# Patient Record
Sex: MALE | Race: BLACK OR AFRICAN AMERICAN | NOT HISPANIC OR LATINO | Employment: UNEMPLOYED | URBAN - METROPOLITAN AREA
[De-identification: names, ages, dates, MRNs, and addresses within clinical notes are randomized per-mention and may not be internally consistent; named-entity substitution may affect disease eponyms.]

---

## 2022-08-09 ENCOUNTER — OFFICE VISIT (OUTPATIENT)
Dept: URGENT CARE | Facility: CLINIC | Age: 27
End: 2022-08-09
Payer: COMMERCIAL

## 2022-08-09 VITALS — TEMPERATURE: 97.5 F | OXYGEN SATURATION: 99 % | HEART RATE: 86 BPM | WEIGHT: 165 LBS

## 2022-08-09 DIAGNOSIS — S61.219A LACERATION WITHOUT FOREIGN BODY OF UNSPECIFIED FINGER WITHOUT DAMAGE TO NAIL, INITIAL ENCOUNTER: Primary | ICD-10-CM

## 2022-08-09 PROCEDURE — G0382 LEV 3 HOSP TYPE B ED VISIT: HCPCS | Performed by: EMERGENCY MEDICINE

## 2022-08-09 PROCEDURE — 90715 TDAP VACCINE 7 YRS/> IM: CPT

## 2022-08-09 RX ORDER — DOXYCYCLINE HYCLATE 100 MG/1
100 CAPSULE ORAL 2 TIMES DAILY
Qty: 20 CAPSULE | Refills: 0 | Status: SHIPPED | OUTPATIENT
Start: 2022-08-09 | End: 2022-08-19

## 2022-08-09 RX ORDER — LIDOCAINE HYDROCHLORIDE 20 MG/ML
10 INJECTION, SOLUTION EPIDURAL; INFILTRATION; INTRACAUDAL; PERINEURAL ONCE
Status: DISCONTINUED | OUTPATIENT
Start: 2022-08-09 | End: 2022-08-09

## 2022-08-09 NOTE — PROGRESS NOTES
[facility]        NAME: Morales Scott is a 32 y o  male  : 1995    MRN: [id number]  DATE: 2022  TIME: 7:05 PM    Assessment and Plan   Laceration without foreign body of unspecified finger without damage to nail, initial encounter [S61 219A]  1  Laceration without foreign body of unspecified finger without damage to nail, initial encounter  Tdap Vaccine greater than or equal to 6yo    doxycycline hyclate (VIBRAMYCIN) 100 mg capsule    DISCONTINUED: lidocaine (PF) (XYLOCAINE-MPF) 2 % injection 10 mL         Patient Instructions   Patient Instructions   1  Clean and dry x 24 hrs  2  Cleanse twice a day with peroxide  3  Suture removal 10-12 days  4  Wound precautions  5  F/u with PCP in 2-3 days  6  If redness, pus, etc , start the antibiotic; the only way to tell if there is a fracture, is to X-Ray it  Return immediately if any problems        Follow up with PCP in 3-5 days  Proceed to  ER if symptoms worsen  Chief Complaint     Chief Complaint   Patient presents with    Laceration     Sustained laceration to right hand  Tetanus is NOT UTD  History of Present Illness       33 yo b male with cc cutting his R hand after a saw fell off a ladder landing on his hand  Pt  Is requesting no lidocaine or X-Rays, due to no insurance  Review of Systems   Review of Systems   Constitutional: Negative for chills and fever  HENT: Negative for congestion and rhinorrhea  Eyes: Negative for discharge and visual disturbance  Respiratory: Negative for shortness of breath and wheezing  Cardiovascular: Negative for chest pain and palpitations  Gastrointestinal: Negative for abdominal pain and vomiting  Endocrine: Negative for polydipsia and polyuria  Genitourinary: Negative for dysuria and hematuria  Musculoskeletal: Negative for arthralgias, gait problem and neck stiffness  Skin: Positive for wound  Negative for rash     Neurological: Negative for dizziness and headaches  Psychiatric/Behavioral: Negative for confusion and suicidal ideas  Current Medications       Current Outpatient Medications:     doxycycline hyclate (VIBRAMYCIN) 100 mg capsule, Take 1 capsule (100 mg total) by mouth 2 (two) times a day for 10 days, Disp: 20 capsule, Rfl: 0  No current facility-administered medications for this visit  Current Allergies     Allergies as of 08/09/2022 - Reviewed 08/09/2022   Allergen Reaction Noted    Soybean-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 08/09/2022    Treenut [nuts - food allergy] Anaphylaxis 08/09/2022    Penicillins Swelling 08/09/2022            The following portions of the patient's history were reviewed and updated as appropriate: allergies, current medications, past family history, past medical history, past social history, past surgical history and problem list      Past Medical History:   Diagnosis Date    Asthma        History reviewed  No pertinent surgical history  History reviewed  No pertinent family history  Medications have been verified  Objective   Pulse 86   Temp 97 5 °F (36 4 °C) (Tympanic)   Wt 74 8 kg (165 lb)   SpO2 99%  d       Physical Exam     Physical Exam  Vitals and nursing note reviewed  Constitutional:       Appearance: Normal appearance  Comments: 33 yo b male sitting on the stretcher with a laceration to his R hand  HENT:      Head: Normocephalic and atraumatic  Nose: Nose normal    Eyes:      Extraocular Movements: Extraocular movements intact  Conjunctiva/sclera: Conjunctivae normal    Cardiovascular:      Rate and Rhythm: Normal rate  Pulmonary:      Effort: Pulmonary effort is normal    Musculoskeletal:         General: Normal range of motion  Cervical back: Neck supple  Skin:     General: Skin is warm and dry  Comments: + 1" lac to the dorsum of R hand, mid 5th MCP region with some swelling    Full ROM, in flexion and extension, aduction and abduction  Neurological:      General: No focal deficit present  Mental Status: He is alert and oriented to person, place, and time  Psychiatric:         Mood and Affect: Mood normal        Laceration repair    Date/Time: 8/9/2022 12:30 PM  Performed by: Yu Turcios DO  Authorized by: Yu Turcios DO   Consent: Verbal consent obtained  Risks and benefits: risks, benefits and alternatives were discussed  Patient understanding: patient states understanding of the procedure being performed  Patient identity confirmed: verbally with patient  Body area: upper extremity  Location details: right hand  Laceration length: 2 5 cm  Tendon involvement: none  Nerve involvement: none  Vascular damage: no  Anesthesia method: Pt  requested no anesthesia  Anesthesia:  Anesthetic total: 0 mL    Sedation:  Patient sedated: no      Wound Dehiscence:  Superficial Wound Dehiscence: simple closure      Procedure Details:  Preparation: Patient was prepped and draped in the usual sterile fashion  Irrigation solution: saline  Irrigation method: syringe  Amount of cleaning: standard  Debridement: none  Degree of undermining: none  Skin closure: 4-0 nylon  Number of sutures: 3  Technique: simple  Approximation: close  Approximation difficulty: simple  Dressing: bandaid    Patient tolerance: patient tolerated the procedure well with no immediate complications

## 2022-08-09 NOTE — PATIENT INSTRUCTIONS
Clean and dry x 24 hrs  Cleanse twice a day with peroxide  Suture removal 10-12 days  Wound precautions  F/u with PCP in 2-3 days  If redness, pus, etc , start the antibiotic; the only way to tell if there is a fracture, is to X-Ray it   Return immediately if any problems

## 2023-08-21 ENCOUNTER — APPOINTMENT (OUTPATIENT)
Dept: RADIOLOGY | Facility: HOSPITAL | Age: 28
End: 2023-08-21
Payer: COMMERCIAL

## 2023-08-21 ENCOUNTER — HOSPITAL ENCOUNTER (EMERGENCY)
Facility: HOSPITAL | Age: 28
Discharge: HOME | End: 2023-08-22
Admitting: EMERGENCY MEDICINE
Payer: COMMERCIAL

## 2023-08-21 VITALS
TEMPERATURE: 97.9 F | DIASTOLIC BLOOD PRESSURE: 90 MMHG | HEART RATE: 85 BPM | SYSTOLIC BLOOD PRESSURE: 138 MMHG | BODY MASS INDEX: 25.07 KG/M2 | OXYGEN SATURATION: 99 % | HEIGHT: 66 IN | RESPIRATION RATE: 18 BRPM | WEIGHT: 156 LBS

## 2023-08-21 DIAGNOSIS — R07.9 CHEST PAIN, UNSPECIFIED TYPE: Primary | ICD-10-CM

## 2023-08-21 DIAGNOSIS — E87.6 HYPOKALEMIA: ICD-10-CM

## 2023-08-21 LAB
ALBUMIN SERPL BCP-MCNC: 4.4 G/DL (ref 3.5–5)
ALP SERPL-CCNC: 54 U/L (ref 34–104)
ALT SERPL W P-5'-P-CCNC: 14 U/L (ref 7–52)
ANION GAP SERPL CALCULATED.3IONS-SCNC: 9 MMOL/L
AST SERPL W P-5'-P-CCNC: 26 U/L (ref 13–39)
BASOPHILS # BLD AUTO: 0.05 THOUSANDS/ÂΜL (ref 0–0.1)
BASOPHILS NFR BLD AUTO: 1 % (ref 0–1)
BILIRUB SERPL-MCNC: 0.4 MG/DL (ref 0.2–1)
BUN SERPL-MCNC: 9 MG/DL (ref 5–25)
CALCIUM SERPL-MCNC: 9.3 MG/DL (ref 8.4–10.2)
CARDIAC TROPONIN I PNL SERPL HS: <2 NG/L
CHLORIDE SERPL-SCNC: 102 MMOL/L (ref 96–108)
CO2 SERPL-SCNC: 29 MMOL/L (ref 21–32)
CREAT SERPL-MCNC: 1.14 MG/DL (ref 0.6–1.3)
EOSINOPHIL # BLD AUTO: 0.25 THOUSAND/ÂΜL (ref 0–0.61)
EOSINOPHIL NFR BLD AUTO: 3 % (ref 0–6)
ERYTHROCYTE [DISTWIDTH] IN BLOOD BY AUTOMATED COUNT: 13.8 % (ref 11.6–15.1)
GFR SERPL CREATININE-BSD FRML MDRD: 85 ML/MIN/1.73SQ M
GLUCOSE SERPL-MCNC: 87 MG/DL (ref 65–140)
HCT VFR BLD AUTO: 41.7 % (ref 36.5–49.3)
HGB BLD-MCNC: 14 G/DL (ref 12–17)
IMM GRANULOCYTES # BLD AUTO: 0.02 THOUSAND/UL (ref 0–0.2)
IMM GRANULOCYTES NFR BLD AUTO: 0 % (ref 0–2)
LYMPHOCYTES # BLD AUTO: 3.32 THOUSANDS/ÂΜL (ref 0.6–4.47)
LYMPHOCYTES NFR BLD AUTO: 39 % (ref 14–44)
MCH RBC QN AUTO: 28.8 PG (ref 26.8–34.3)
MCHC RBC AUTO-ENTMCNC: 33.6 G/DL (ref 31.4–37.4)
MCV RBC AUTO: 86 FL (ref 82–98)
MONOCYTES # BLD AUTO: 0.75 THOUSAND/ÂΜL (ref 0.17–1.22)
MONOCYTES NFR BLD AUTO: 9 % (ref 4–12)
NEUTROPHILS # BLD AUTO: 4.18 THOUSANDS/ÂΜL (ref 1.85–7.62)
NEUTS SEG NFR BLD AUTO: 48 % (ref 43–75)
NRBC BLD AUTO-RTO: 0 /100 WBCS
PLATELET # BLD AUTO: 222 THOUSANDS/UL (ref 149–390)
PMV BLD AUTO: 10.9 FL (ref 8.9–12.7)
POTASSIUM SERPL-SCNC: 2.9 MMOL/L (ref 3.5–5.3)
PROT SERPL-MCNC: 7.8 G/DL (ref 6.4–8.4)
RBC # BLD AUTO: 4.86 MILLION/UL (ref 3.88–5.62)
SODIUM SERPL-SCNC: 140 MMOL/L (ref 135–147)
WBC # BLD AUTO: 8.57 THOUSAND/UL (ref 4.31–10.16)

## 2023-08-21 PROCEDURE — 99285 EMERGENCY DEPT VISIT HI MDM: CPT

## 2023-08-21 PROCEDURE — 71046 X-RAY EXAM CHEST 2 VIEWS: CPT

## 2023-08-21 PROCEDURE — 80053 COMPREHEN METABOLIC PANEL: CPT

## 2023-08-21 PROCEDURE — 85025 COMPLETE CBC W/AUTO DIFF WBC: CPT

## 2023-08-21 PROCEDURE — 36415 COLL VENOUS BLD VENIPUNCTURE: CPT

## 2023-08-21 PROCEDURE — 84484 ASSAY OF TROPONIN QUANT: CPT

## 2023-08-21 PROCEDURE — 93005 ELECTROCARDIOGRAM TRACING: CPT

## 2023-08-22 LAB
ATRIAL RATE: 75 BPM
CARDIAC TROPONIN I PNL SERPL HS: <2 NG/L
MAGNESIUM SERPL-MCNC: 2.2 MG/DL (ref 1.9–2.7)
P AXIS: 59 DEGREES
PR INTERVAL: 166 MS
QRS AXIS: 86 DEGREES
QRSD INTERVAL: 88 MS
QT INTERVAL: 374 MS
QTC INTERVAL: 417 MS
T WAVE AXIS: 37 DEGREES
VENTRICULAR RATE: 75 BPM

## 2023-08-22 PROCEDURE — 83735 ASSAY OF MAGNESIUM: CPT

## 2023-08-22 PROCEDURE — 99284 EMERGENCY DEPT VISIT MOD MDM: CPT | Performed by: EMERGENCY MEDICINE

## 2023-08-22 PROCEDURE — 93010 ELECTROCARDIOGRAM REPORT: CPT | Performed by: INTERNAL MEDICINE

## 2023-08-22 PROCEDURE — 36415 COLL VENOUS BLD VENIPUNCTURE: CPT

## 2023-08-22 PROCEDURE — 84484 ASSAY OF TROPONIN QUANT: CPT

## 2023-08-22 PROCEDURE — 96365 THER/PROPH/DIAG IV INF INIT: CPT

## 2023-08-22 RX ORDER — POTASSIUM CHLORIDE 20 MEQ/1
20 TABLET, EXTENDED RELEASE ORAL ONCE
Status: COMPLETED | OUTPATIENT
Start: 2023-08-22 | End: 2023-08-22

## 2023-08-22 RX ORDER — POTASSIUM CHLORIDE 14.9 MG/ML
20 INJECTION INTRAVENOUS ONCE
Status: COMPLETED | OUTPATIENT
Start: 2023-08-22 | End: 2023-08-22

## 2023-08-22 RX ADMIN — POTASSIUM CHLORIDE 20 MEQ: 200 INJECTION, SOLUTION INTRAVENOUS at 00:47

## 2023-08-22 RX ADMIN — POTASSIUM CHLORIDE 20 MEQ: 1500 TABLET, EXTENDED RELEASE ORAL at 00:47

## 2023-08-22 NOTE — ED PROVIDER NOTES
History  Chief Complaint   Patient presents with   • Chest Pain     Patient c/o left upper chest pain that started around 5pm.  Patient reports feeling lightheaded. Patient is a 69-year-old male with no pertinent past medical history presents ED for chest pain. Symptoms started around 5 PM tonight. Complains of substernal chest pain, characterizes this as burning. This is nonradiating. States that this lasted for about 15 to 20 minutes. States that he did eat [employer] prior to symptoms developing. Reports that he was at a corn hole tournament tonight and felt dizzy intermittently. States that he has not been feeling well. He denies shortness of breath, cough, fever, chills, abdominal pain, vomiting, diarrhea and vision changes. None       Past Medical History:   Diagnosis Date   • Asthma        History reviewed. No pertinent surgical history. History reviewed. No pertinent family history. I have reviewed and agree with the history as documented. E-Cigarette/Vaping   • E-Cigarette Use Current Every Day User      E-Cigarette/Vaping Substances   • Nicotine Yes      Social History     Tobacco Use   • Smoking status: Former     Packs/day: 0.50     Types: Cigarettes   • Smokeless tobacco: Never   Vaping Use   • Vaping Use: Every day   • Substances: Nicotine   Substance Use Topics   • Alcohol use: Yes     Comment: social   • Drug use: Never       Review of Systems   Constitutional: Negative for chills and fever. HENT: Negative for ear pain and sore throat. Eyes: Negative for pain and visual disturbance. Respiratory: Negative for cough and shortness of breath. Cardiovascular: Positive for chest pain. Negative for palpitations. Gastrointestinal: Negative for abdominal pain and vomiting. Genitourinary: Negative for dysuria and hematuria. Musculoskeletal: Negative for arthralgias and back pain. Skin: Negative for color change and rash. Neurological: Positive for dizziness.  Negative for seizures and syncope. All other systems reviewed and are negative. Physical Exam  Physical Exam  Vitals and nursing note reviewed. Constitutional:       General: He is not in acute distress. Appearance: He is well-developed. He is not ill-appearing. HENT:      Head: Normocephalic and atraumatic. Eyes:      Conjunctiva/sclera: Conjunctivae normal.   Cardiovascular:      Rate and Rhythm: Normal rate and regular rhythm. Heart sounds: No murmur heard. Pulmonary:      Effort: Pulmonary effort is normal. No respiratory distress. Breath sounds: Normal breath sounds. Abdominal:      Palpations: Abdomen is soft. Tenderness: There is no abdominal tenderness. Musculoskeletal:         General: No swelling. Cervical back: Neck supple. Skin:     General: Skin is warm and dry. Capillary Refill: Capillary refill takes less than 2 seconds. Neurological:      Mental Status: He is alert.    Psychiatric:         Mood and Affect: Mood normal.         Vital Signs  ED Triage Vitals [08/21/23 2250]   Temperature Pulse Respirations Blood Pressure SpO2   97.9 °F (36.6 °C) 85 18 138/90 99 %      Temp Source Heart Rate Source Patient Position - Orthostatic VS BP Location FiO2 (%)   Tympanic Monitor Sitting Left arm --      Pain Score       --           Vitals:    08/21/23 2250   BP: 138/90   Pulse: 85   Patient Position - Orthostatic VS: Sitting         Visual Acuity      ED Medications  Medications   potassium chloride 20 mEq IVPB (premix) (20 mEq Intravenous New Bag 8/22/23 0047)   potassium chloride (K-DUR,KLOR-CON) CR tablet 20 mEq (20 mEq Oral Given 8/22/23 0047)       Diagnostic Studies  Results Reviewed     Procedure Component Value Units Date/Time    HS Troponin I 2hr [[id number]] Collected: 08/22/23 0028    Lab Status: Final result Specimen: Blood from Arm, Right Updated: 08/22/23 0101     hs TnI 2hr <2 ng/L      Delta 2hr hsTnI --    Magnesium [[id number]]  (Normal) Collected: 08/22/23 0028    Lab Status: Final result Specimen: Blood from Arm, Right Updated: 08/22/23 0052     Magnesium 2.2 mg/dL     HS Troponin I 4hr [[id number]]     Lab Status: No result Specimen: Blood     HS Troponin 0hr (reflex protocol) [[id number]]  (Normal) Collected: 08/21/23 2256    Lab Status: Final result Specimen: Blood from Arm, Left Updated: 08/21/23 2345     hs TnI 0hr <2 ng/L     Comprehensive metabolic panel [[id number]]  (Abnormal) Collected: 08/21/23 2256    Lab Status: Final result Specimen: Blood from Arm, Left Updated: 08/21/23 2340     Sodium 140 mmol/L      Potassium 2.9 mmol/L      Chloride 102 mmol/L      CO2 29 mmol/L      ANION GAP 9 mmol/L      BUN 9 mg/dL      Creatinine 1.14 mg/dL      Glucose 87 mg/dL      Calcium 9.3 mg/dL      AST 26 U/L      ALT 14 U/L      Alkaline Phosphatase 54 U/L      Total Protein 7.8 g/dL      Albumin 4.4 g/dL      Total Bilirubin 0.40 mg/dL      eGFR 85 ml/min/1.73sq m     Narrative:      [place] guidelines for Chronic Kidney Disease (CKD):   •  Stage 1 with normal or high GFR (GFR > 90 mL/min/1.73 square meters)  •  Stage 2 Mild CKD (GFR = 60-89 mL/min/1.73 square meters)  •  Stage 3A Moderate CKD (GFR = 45-59 mL/min/1.73 square meters)  •  Stage 3B Moderate CKD (GFR = 30-44 mL/min/1.73 square meters)  •  Stage 4 Severe CKD (GFR = 15-29 mL/min/1.73 square meters)  •  Stage 5 End Stage CKD (GFR <15 mL/min/1.73 square meters)  Note: GFR calculation is accurate only with a steady state creatinine    CBC and differential [[id number]] Collected: 08/21/23 2256    Lab Status: Final result Specimen: Blood from Arm, Left Updated: 08/21/23 2312     WBC 8.57 Thousand/uL      RBC 4.86 Million/uL      Hemoglobin 14.0 g/dL      Hematocrit 41.7 %      MCV 86 fL      MCH 28.8 pg      MCHC 33.6 g/dL      RDW 13.8 %      MPV 10.9 fL      Platelets 628 Thousands/uL      nRBC 0 /100 WBCs      Neutrophils Relative 48 %      Immat GRANS % 0 %      Lymphocytes Relative 39 %      Monocytes Relative 9 %      Eosinophils Relative 3 %      Basophils Relative 1 %      Neutrophils Absolute 4.18 Thousands/µL      Immature Grans Absolute 0.02 Thousand/uL      Lymphocytes Absolute 3.32 Thousands/µL      Monocytes Absolute 0.75 Thousand/µL      Eosinophils Absolute 0.25 Thousand/µL      Basophils Absolute 0.05 Thousands/µL                  XR chest pa & lateral    (Results Pending)              Procedures  Procedures         ED Course                               SBIRT 20yo+    Flowsheet Row Most Recent Value   Initial Alcohol Screen: US AUDIT-C     1. How often do you have a drink containing alcohol? 0 Filed at: 08/21/2023 2252   2. How many drinks containing alcohol do you have on a typical day you are drinking? 0 Filed at: 08/21/2023 2252   3a. Male UNDER 65: How often do you have five or more drinks on one occasion? 0 Filed at: 08/21/2023 2252   3b. FEMALE Any Age, or MALE 65+: How often do you have 4 or more drinks on one occassion? 0 Filed at: 08/21/2023 2252   Audit-C Score 0 Filed at: 08/21/2023 2252   MARGO: How many times in the past year have you. .. Used an illegal drug or used a prescription medication for non-medical reasons? Never Filed at: 08/21/2023 2252                    Medical Decision Making  Amount and/or Complexity of Data Reviewed  Labs: ordered. Risk  Prescription drug management. Patient is a 25-year-old male who presents to ED for chest pain. Labs reviewed. Potassium was 2.9. Repleted this in the ED. X-ray and EKG are unremarkable. Gave strict return precautions, verbalized understanding.     Disposition  Final diagnoses:   Chest pain, unspecified type   Hypokalemia     Time reflects when diagnosis was documented in both MDM as applicable and the Disposition within this note     Time User Action Codes Description Comment    8/22/2023  1:42 AM Suyapa Bravo Add [R07.9] Chest pain, unspecified type     8/22/2023  1:43 AM Valarie Brian Add [E87.6] Hypokalemia       ED Disposition     ED Disposition   Discharge    Condition   Stable    Date/Time   Tue Aug 22, 2023  1:42 AM    Comment   Gisel Godoy discharge to home/self care. Follow-up Information    None         Patient's Medications    No medications on file       No discharge procedures on file.     PDMP Review     None          ED Provider  Electronically Signed by           Maria Dolores Galeano PA-C  08/22/23 7562

## 2023-10-03 ENCOUNTER — OFFICE VISIT (OUTPATIENT)
Dept: URGENT CARE | Facility: CLINIC | Age: 28
End: 2023-10-03
Payer: COMMERCIAL

## 2023-10-03 VITALS
TEMPERATURE: 98.5 F | HEART RATE: 73 BPM | DIASTOLIC BLOOD PRESSURE: 81 MMHG | OXYGEN SATURATION: 97 % | SYSTOLIC BLOOD PRESSURE: 120 MMHG | RESPIRATION RATE: 18 BRPM

## 2023-10-03 DIAGNOSIS — K12.2 CELLULITIS OF MOUTH: Primary | ICD-10-CM

## 2023-10-03 PROCEDURE — S9088 SERVICES PROVIDED IN URGENT: HCPCS | Performed by: PHYSICIAN ASSISTANT

## 2023-10-03 PROCEDURE — 99213 OFFICE O/P EST LOW 20 MIN: CPT | Performed by: PHYSICIAN ASSISTANT

## 2023-10-03 RX ORDER — CLINDAMYCIN HYDROCHLORIDE 300 MG/1
300 CAPSULE ORAL EVERY 6 HOURS
COMMUNITY
Start: 2023-08-29

## 2023-10-03 RX ORDER — CLINDAMYCIN HYDROCHLORIDE 300 MG/1
300 CAPSULE ORAL 3 TIMES DAILY
Qty: 30 CAPSULE | Refills: 0 | Status: SHIPPED | OUTPATIENT
Start: 2023-10-03 | End: 2023-10-13

## 2023-10-03 NOTE — PROGRESS NOTES
[facility]        NAME: Checo Olmos is a 25 y.o. male  : 1995    MRN: [id number]  DATE: October 3, 2023  TIME: 4:19 PM      Assessment and Plan     Cellulitis of mouth [K12.2]  1. Cellulitis of mouth  clindamycin (CLEOCIN) 300 MG capsule            Patient Instructions   There are no Patient Instructions on file for this visit. Follow up with PCP in 3-5 days. Go to ER if symptoms worsen. Chief Complaint     Chief Complaint   Patient presents with   • Edema     Patient has a swollen cheek, he had it about a month ago, and was on antibiotics. He states he has bad teeth . History of Present Illness     Patient presents with right cheek swollen x 2 days. He states this happened about 1 month ago and he was put on clindamycin. He states he stopped taking the medicine when he felt better. He states he has tooth issues and still has his impacted wisdom teeth. Review of Systems     Review of Systems   Constitutional: Negative for chills, fatigue and fever. HENT: Positive for dental problem and facial swelling. Negative for congestion, ear pain, postnasal drip, rhinorrhea, sinus pressure, sinus pain, sneezing and sore throat. Eyes: Negative for pain and visual disturbance. Respiratory: Negative for cough and shortness of breath. Cardiovascular: Negative for chest pain and palpitations. Gastrointestinal: Negative for abdominal pain, diarrhea, nausea and vomiting. Genitourinary: Negative for dysuria and hematuria. Musculoskeletal: Negative for arthralgias, back pain and myalgias. Skin: Negative for rash. Neurological: Negative for dizziness, seizures, syncope, numbness and headaches. All other systems reviewed and are negative.         Current Medications       Current Outpatient Medications:   •  clindamycin (CLEOCIN) 300 MG capsule, Take 300 mg by mouth every 6 (six) hours, Disp: , Rfl:   •  clindamycin (CLEOCIN) 300 MG capsule, Take 1 capsule (300 mg total) by mouth 3 (three) times a day for 10 days, Disp: 30 capsule, Rfl: 0    Current Allergies     Allergies as of 10/03/2023 - Reviewed 10/03/2023   Allergen Reaction Noted   • Soybean-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 08/09/2022   • Treenut [nuts - food allergy] Anaphylaxis 08/09/2022   • Penicillins Swelling 08/09/2022              The following portions of the patient's history were reviewed and updated as appropriate: allergies, current medications, past family history, past medical history, past social history, past surgical history, and problem list.     Past Medical History:   Diagnosis Date   • Asthma        History reviewed. No pertinent surgical history. History reviewed. No pertinent family history. Medications have been verified. Objective     /81   Pulse 73   Temp 98.5 °F (36.9 °C)   Resp 18   SpO2 97%   No LMP for male patient. Physical Exam     Physical Exam  Vitals and nursing note reviewed. Constitutional:       Appearance: Normal appearance. He is normal weight. HENT:      Head: Normocephalic and atraumatic. Mouth/Throat:      Dentition: Gingival swelling present. Comments: Right upper posterior arch with mild swelling and erythema. Right cheek moderately swollen. Cardiovascular:      Rate and Rhythm: Normal rate and regular rhythm. Heart sounds: Normal heart sounds. Pulmonary:      Effort: Pulmonary effort is normal.      Breath sounds: Normal breath sounds. Skin:     General: Skin is warm and dry. Neurological:      General: No focal deficit present. Mental Status: He is alert and oriented to person, place, and time.    Psychiatric:         Mood and Affect: Mood normal.         Behavior: Behavior normal.

## 2024-03-03 ENCOUNTER — HOSPITAL ENCOUNTER (EMERGENCY)
Facility: HOSPITAL | Age: 29
Discharge: HOME | End: 2024-03-04
Attending: EMERGENCY MEDICINE
Payer: COMMERCIAL

## 2024-03-03 ENCOUNTER — APPOINTMENT (EMERGENCY)
Dept: CT IMAGING | Facility: HOSPITAL | Age: 29
End: 2024-03-03
Payer: COMMERCIAL

## 2024-03-03 DIAGNOSIS — K08.89 PAIN, DENTAL: Primary | ICD-10-CM

## 2024-03-03 DIAGNOSIS — R22.0 FACIAL SWELLING: ICD-10-CM

## 2024-03-03 LAB
ALBUMIN SERPL BCP-MCNC: 4.4 G/DL (ref 3.5–5)
ALP SERPL-CCNC: 54 U/L (ref 34–104)
ALT SERPL W P-5'-P-CCNC: 15 U/L (ref 7–52)
ANION GAP SERPL CALCULATED.3IONS-SCNC: 8 MMOL/L
AST SERPL W P-5'-P-CCNC: 19 U/L (ref 13–39)
BASOPHILS # BLD AUTO: 0.04 THOUSANDS/ÂΜL (ref 0–0.1)
BASOPHILS NFR BLD AUTO: 0 % (ref 0–1)
BILIRUB SERPL-MCNC: 1.03 MG/DL (ref 0.2–1)
BUN SERPL-MCNC: 12 MG/DL (ref 5–25)
CALCIUM SERPL-MCNC: 9.3 MG/DL (ref 8.4–10.2)
CHLORIDE SERPL-SCNC: 101 MMOL/L (ref 96–108)
CO2 SERPL-SCNC: 28 MMOL/L (ref 21–32)
CREAT SERPL-MCNC: 0.95 MG/DL (ref 0.6–1.3)
EOSINOPHIL # BLD AUTO: 0.12 THOUSAND/ÂΜL (ref 0–0.61)
EOSINOPHIL NFR BLD AUTO: 1 % (ref 0–6)
ERYTHROCYTE [DISTWIDTH] IN BLOOD BY AUTOMATED COUNT: 13.6 % (ref 11.6–15.1)
GFR SERPL CREATININE-BSD FRML MDRD: 106 ML/MIN/1.73SQ M
GLUCOSE SERPL-MCNC: 86 MG/DL (ref 65–140)
HCT VFR BLD AUTO: 44 % (ref 36.5–49.3)
HGB BLD-MCNC: 15.1 G/DL (ref 12–17)
IMM GRANULOCYTES # BLD AUTO: 0.05 THOUSAND/UL (ref 0–0.2)
IMM GRANULOCYTES NFR BLD AUTO: 0 % (ref 0–2)
LACTATE SERPL-SCNC: 0.8 MMOL/L (ref 0.5–2)
LYMPHOCYTES # BLD AUTO: 1.41 THOUSANDS/ÂΜL (ref 0.6–4.47)
LYMPHOCYTES NFR BLD AUTO: 10 % (ref 14–44)
MCH RBC QN AUTO: 30 PG (ref 26.8–34.3)
MCHC RBC AUTO-ENTMCNC: 34.3 G/DL (ref 31.4–37.4)
MCV RBC AUTO: 88 FL (ref 82–98)
MONOCYTES # BLD AUTO: 1.53 THOUSAND/ÂΜL (ref 0.17–1.22)
MONOCYTES NFR BLD AUTO: 11 % (ref 4–12)
NEUTROPHILS # BLD AUTO: 10.84 THOUSANDS/ÂΜL (ref 1.85–7.62)
NEUTS SEG NFR BLD AUTO: 78 % (ref 43–75)
NRBC BLD AUTO-RTO: 0 /100 WBCS
PLATELET # BLD AUTO: 250 THOUSANDS/UL (ref 149–390)
PMV BLD AUTO: 10.4 FL (ref 8.9–12.7)
POTASSIUM SERPL-SCNC: 3.5 MMOL/L (ref 3.5–5.3)
PROT SERPL-MCNC: 8.4 G/DL (ref 6.4–8.4)
RBC # BLD AUTO: 5.03 MILLION/UL (ref 3.88–5.62)
SODIUM SERPL-SCNC: 137 MMOL/L (ref 135–147)
WBC # BLD AUTO: 13.99 THOUSAND/UL (ref 4.31–10.16)

## 2024-03-03 PROCEDURE — 99285 EMERGENCY DEPT VISIT HI MDM: CPT | Performed by: PHYSICIAN ASSISTANT

## 2024-03-03 PROCEDURE — 70491 CT SOFT TISSUE NECK W/DYE: CPT

## 2024-03-03 PROCEDURE — 96367 TX/PROPH/DG ADDL SEQ IV INF: CPT

## 2024-03-03 PROCEDURE — 80053 COMPREHEN METABOLIC PANEL: CPT | Performed by: PHYSICIAN ASSISTANT

## 2024-03-03 PROCEDURE — 87040 BLOOD CULTURE FOR BACTERIA: CPT | Performed by: PHYSICIAN ASSISTANT

## 2024-03-03 PROCEDURE — 99283 EMERGENCY DEPT VISIT LOW MDM: CPT

## 2024-03-03 PROCEDURE — 85025 COMPLETE CBC W/AUTO DIFF WBC: CPT | Performed by: PHYSICIAN ASSISTANT

## 2024-03-03 PROCEDURE — 36415 COLL VENOUS BLD VENIPUNCTURE: CPT | Performed by: PHYSICIAN ASSISTANT

## 2024-03-03 PROCEDURE — 83605 ASSAY OF LACTIC ACID: CPT | Performed by: PHYSICIAN ASSISTANT

## 2024-03-03 PROCEDURE — 96375 TX/PRO/DX INJ NEW DRUG ADDON: CPT

## 2024-03-03 PROCEDURE — 96365 THER/PROPH/DIAG IV INF INIT: CPT

## 2024-03-03 RX ORDER — ACETAMINOPHEN 10 MG/ML
1000 INJECTION, SOLUTION INTRAVENOUS ONCE
Status: COMPLETED | OUTPATIENT
Start: 2024-03-03 | End: 2024-03-03

## 2024-03-03 RX ORDER — KETOROLAC TROMETHAMINE 30 MG/ML
15 INJECTION, SOLUTION INTRAMUSCULAR; INTRAVENOUS ONCE
Status: COMPLETED | OUTPATIENT
Start: 2024-03-03 | End: 2024-03-03

## 2024-03-03 RX ORDER — DEXAMETHASONE SODIUM PHOSPHATE 10 MG/ML
8 INJECTION, SOLUTION INTRAMUSCULAR; INTRAVENOUS ONCE
Status: COMPLETED | OUTPATIENT
Start: 2024-03-03 | End: 2024-03-03

## 2024-03-03 RX ORDER — CLINDAMYCIN PHOSPHATE 600 MG/50ML
600 INJECTION, SOLUTION INTRAVENOUS ONCE
Status: COMPLETED | OUTPATIENT
Start: 2024-03-03 | End: 2024-03-03

## 2024-03-03 RX ADMIN — KETOROLAC TROMETHAMINE 15 MG: 30 INJECTION, SOLUTION INTRAMUSCULAR; INTRAVENOUS at 22:25

## 2024-03-03 RX ADMIN — ACETAMINOPHEN 1000 MG: 10 INJECTION INTRAVENOUS at 22:28

## 2024-03-03 RX ADMIN — IOHEXOL 85 ML: 350 INJECTION, SOLUTION INTRAVENOUS at 23:34

## 2024-03-03 RX ADMIN — DEXAMETHASONE SODIUM PHOSPHATE 8 MG: 10 INJECTION, SOLUTION INTRAMUSCULAR; INTRAVENOUS at 22:26

## 2024-03-03 RX ADMIN — SODIUM CHLORIDE 1000 ML: 0.9 INJECTION, SOLUTION INTRAVENOUS at 22:30

## 2024-03-03 RX ADMIN — CLINDAMYCIN PHOSPHATE 600 MG: 600 INJECTION, SOLUTION INTRAVENOUS at 22:47

## 2024-03-04 VITALS
SYSTOLIC BLOOD PRESSURE: 130 MMHG | DIASTOLIC BLOOD PRESSURE: 75 MMHG | HEART RATE: 89 BPM | RESPIRATION RATE: 19 BRPM | TEMPERATURE: 98.7 F | OXYGEN SATURATION: 99 %

## 2024-03-04 RX ORDER — CLINDAMYCIN HYDROCHLORIDE 150 MG/1
450 CAPSULE ORAL 3 TIMES DAILY
Qty: 63 CAPSULE | Refills: 0 | Status: SHIPPED | OUTPATIENT
Start: 2024-03-04 | End: 2024-03-11

## 2024-03-04 NOTE — ED PROVIDER NOTES
History  Chief Complaint   Patient presents with    Oral Swelling     Moderate swelling to upper left portion of hard palate around wisdom teeth starting three days ago. Patient reports difficulty eating and drinking due to swelling and pain. Aleve and naproxen taken this AM, patient unsure of time but reports nothing since.      Radha Rivers is a 28-year-old male with past medical history including asthma arriving ambulatory to the emergency department for evaluation with complaint of worsening left-sided dental pain and facial swelling.  Patient reports symptom onset about 2 days ago which has seemingly gradually worsened since time of onset.  Patient reports generally poor dentition and has not appreciated any recent dental injury or trauma.  He reports pain with swallowing as well as odynophagia.  No appreciable dysphonia.  He denies any drooling or trouble tolerating oral secretions.  He has reported subjective fevers and chills over the past couple of days as well.  He denies headache, eye pain or pain with eye movements, photophobia or phonophobia, or ear pain.  He denies sinus pressure, nasal congestion or rhinorrhea. Admits to vaping nicotine. He offers no other complaints or concerns at this time.        Prior to Admission Medications   Prescriptions Last Dose Informant Patient Reported? Taking?   clindamycin (CLEOCIN) 300 MG capsule   Yes No   Sig: Take 300 mg by mouth every 6 (six) hours      Facility-Administered Medications: None       Past Medical History:   Diagnosis Date    Asthma        History reviewed. No pertinent surgical history.    History reviewed. No pertinent family history.  I have reviewed and agree with the history as documented.    E-Cigarette/Vaping    E-Cigarette Use Current Every Day User      E-Cigarette/Vaping Substances    Nicotine Yes      Social History     Tobacco Use    Smoking status: Former     Current packs/day: 0.50     Types: Cigarettes    Smokeless tobacco: Never    Vaping Use    Vaping status: Every Day    Substances: Nicotine   Substance Use Topics    Alcohol use: Yes     Comment: social    Drug use: Yes     Types: Marijuana       Review of Systems   Constitutional:  Positive for chills and fever.   HENT:  Positive for dental problem, facial swelling and sore throat. Negative for trouble swallowing.    Eyes:  Negative for photophobia, pain and visual disturbance.   Gastrointestinal:  Negative for nausea and vomiting.   Musculoskeletal:  Negative for neck pain and neck stiffness.   Neurological:  Negative for headaches.   All other systems reviewed and are negative.      Physical Exam  Physical Exam  Vitals and nursing note reviewed.   Constitutional:       General: He is not in acute distress.     Appearance: Normal appearance. He is well-developed. He is not ill-appearing, toxic-appearing or diaphoretic.   HENT:      Head: Normocephalic and atraumatic.      Right Ear: Tympanic membrane, ear canal and external ear normal.      Left Ear: Tympanic membrane, ear canal and external ear normal.      Nose: Nose normal.      Mouth/Throat:      Lips: Pink.      Mouth: Mucous membranes are moist.      Dentition: Abnormal dentition.      Pharynx: Oropharynx is clear. Uvula midline. No uvula swelling.      Tonsils: No tonsillar exudate or tonsillar abscesses.      Comments: +dental caries with prior dental extractions noted. The patient has diffuse tenderness to palpation along the buccal surface of the left maxillary region as well as swelling surrounding the maxillary molars. No visible facial cellulitis.  Patient is able to speak though he is soft spoken which he admits is due to pain. There is no pooling of oral secretions or dysphonia. No uvula or soft palate deviation. Oropharynx patent, patient protecting airway.  Eyes:      Extraocular Movements: Extraocular movements intact.      Conjunctiva/sclera: Conjunctivae normal.      Pupils: Pupils are equal, round, and reactive to  light.   Cardiovascular:      Rate and Rhythm: Normal rate and regular rhythm.      Pulses: Normal pulses.   Pulmonary:      Effort: Pulmonary effort is normal. No respiratory distress.      Breath sounds: Normal breath sounds. No wheezing, rhonchi or rales.   Chest:      Chest wall: No tenderness.   Musculoskeletal:         General: Normal range of motion.      Cervical back: Normal range of motion and neck supple.   Skin:     General: Skin is warm and dry.      Capillary Refill: Capillary refill takes less than 2 seconds.   Neurological:      Mental Status: He is alert.      Motor: Motor function is intact. No weakness.      Gait: Gait is intact.   Psychiatric:         Mood and Affect: Mood normal.         Vital Signs  ED Triage Vitals [03/03/24 2144]   Temperature Pulse Respirations Blood Pressure SpO2   98.7 °F (37.1 °C) 94 19 139/83 100 %      Temp src Heart Rate Source Patient Position - Orthostatic VS BP Location FiO2 (%)   -- Monitor Sitting Right arm --      Pain Score       --           Vitals:    03/03/24 2144 03/03/24 2345 03/04/24 0000   BP: 139/83 133/72 130/75   Pulse: 94 84 89   Patient Position - Orthostatic VS: Sitting Lying Lying         Visual Acuity      ED Medications  Medications   sodium chloride 0.9 % bolus 1,000 mL (0 mL Intravenous Stopped 3/3/24 2349)   acetaminophen (Ofirmev) injection 1,000 mg (0 mg Intravenous Stopped 3/3/24 2246)   ketorolac (TORADOL) injection 15 mg (15 mg Intravenous Given 3/3/24 2225)   clindamycin in dextrose 5% (Cleocin) IVPB (premix) 600 mg 50 mL (0 mg Intravenous Stopped 3/3/24 2349)   dexamethasone (PF) (DECADRON) injection 8 mg (8 mg Intravenous Given 3/3/24 2226)   iohexol (OMNIPAQUE) 350 MG/ML injection (MULTI-DOSE) 85 mL (85 mL Intravenous Given 3/3/24 2334)       Diagnostic Studies  Results Reviewed       Procedure Component Value Units Date/Time    Lactic acid, plasma (w/reflex if result > 2.0) [[id number]]  (Normal) Collected: 03/03/24 2224    Lab  Status: Final result Specimen: Blood from Arm, Right Updated: 03/03/24 2321     LACTIC ACID 0.8 mmol/L     Narrative:      Result may be elevated if tourniquet was used during collection.    Comprehensive metabolic panel [[id number]]  (Abnormal) Collected: 03/03/24 2224    Lab Status: Final result Specimen: Blood from Arm, Right Updated: 03/03/24 2307     Sodium 137 mmol/L      Potassium 3.5 mmol/L      Chloride 101 mmol/L      CO2 28 mmol/L      ANION GAP 8 mmol/L      BUN 12 mg/dL      Creatinine 0.95 mg/dL      Glucose 86 mg/dL      Calcium 9.3 mg/dL      AST 19 U/L      ALT 15 U/L      Alkaline Phosphatase 54 U/L      Total Protein 8.4 g/dL      Albumin 4.4 g/dL      Total Bilirubin 1.03 mg/dL      eGFR 106 ml/min/1.73sq m     Narrative:      [employer] guidelines for Chronic Kidney Disease (CKD):     Stage 1 with normal or high GFR (GFR > 90 mL/min/1.73 square meters)    Stage 2 Mild CKD (GFR = 60-89 mL/min/1.73 square meters)    Stage 3A Moderate CKD (GFR = 45-59 mL/min/1.73 square meters)    Stage 3B Moderate CKD (GFR = 30-44 mL/min/1.73 square meters)    Stage 4 Severe CKD (GFR = 15-29 mL/min/1.73 square meters)    Stage 5 End Stage CKD (GFR <15 mL/min/1.73 square meters)  Note: GFR calculation is accurate only with a steady state creatinine    CBC and differential [[id number]]  (Abnormal) Collected: 03/03/24 2224    Lab Status: Final result Specimen: Blood from Arm, Right Updated: 03/03/24 2240     WBC 13.99 Thousand/uL      RBC 5.03 Million/uL      Hemoglobin 15.1 g/dL      Hematocrit 44.0 %      MCV 88 fL      MCH 30.0 pg      MCHC 34.3 g/dL      RDW 13.6 %      MPV 10.4 fL      Platelets 250 Thousands/uL      nRBC 0 /100 WBCs      Neutrophils Relative 78 %      Immat GRANS % 0 %      Lymphocytes Relative 10 %      Monocytes Relative 11 %      Eosinophils Relative 1 %      Basophils Relative 0 %      Neutrophils Absolute 10.84 Thousands/µL      Immature Grans Absolute 0.05  Thousand/uL      Lymphocytes Absolute 1.41 Thousands/µL      Monocytes Absolute 1.53 Thousand/µL      Eosinophils Absolute 0.12 Thousand/µL      Basophils Absolute 0.04 Thousands/µL     Blood culture #1 [[id number]] Collected: 03/03/24 2224    Lab Status: In process Specimen: Blood from Arm, Right Updated: 03/03/24 2238    Blood culture #2 [[id number]] Collected: 03/03/24 2224    Lab Status: In process Specimen: Blood from Hand, Right Updated: 03/03/24 2238                   CT soft tissue neck with contrast    (Results Pending)              Procedures  Procedures         ED Course                                             Medical Decision Making  This is a 27yo male presenting with left-sided dental pain and facial swelling, fever/chills. Sx onset about 2 days ago and have been worsening since time of onset.    Differential diagnosis includes but is not limited to: cellulitis, dental abscess, osteitis, osteomyelitis, adenopathy, gingivitis    Initial ED plan: labs, imaging, IV abx    Final ED Assessment: Vital signs reviewed on ED presentation, examination as above. All labs and imaging independently reviewed with imaging interpreted by the Radiologist.  Lab work demonstrates leukocytosis of 13.99.  Remainder of lab work is without significant findings.  On reassessment of the patient, he is reporting improvement in symptoms in response to treatments given as above.  We reviewed lab results and sign out plan. Care signed out to Nahomi Thornton PA-C, while awaiting CT soft tissue report. Disposition pending. Patient hemodynamically stable at time of sign out.      Amount and/or Complexity of Data Reviewed  Labs: ordered.  Radiology: ordered.    Risk  Prescription drug management.             Disposition  Final diagnoses:   None     ED Disposition       None          Follow-up Information    None         Patient's Medications   Discharge Prescriptions    No medications on file       No discharge procedures on  file.    PDMP Review       None            ED Provider  Electronically Signed by             Jennifer Richardson PA-C  03/04/24 0033

## 2024-03-04 NOTE — DISCHARGE INSTRUCTIONS
Antibiotics as prescribed.  Follow-up with your primary care provider and [facility] dental clinic.  Return to the emergency room for any worsening symptoms.

## 2024-03-04 NOTE — ED NOTES
PO trial with ginger ale and crackers per ED physician instruction. Patient reports no issues with oral intake.      Kevin Santos RN  03/04/24 0056

## 2024-03-09 LAB
BACTERIA BLD CULT: NORMAL
BACTERIA BLD CULT: NORMAL